# Patient Record
(demographics unavailable — no encounter records)

---

## 2024-10-15 NOTE — LETTER BODY
[FreeTextEntry1] : Kevin Mistry  [address] [place] [address] P [phone] F [phone]   Dear Dr. Mistry,   Reason for visit: BPH. Left inguinal hernia.    This is a 82 year-old gentleman with hypertension, hyperlipidemia, and history of gout, presenting with chronic BPH and a left inguinal hernia. He returns today for follow up. Since his last visit, the patient reports taking Flomax QD regularly without any side effects or difficulties. Patient reports stable urinary flow. He has not yet undergone hernia repair. Patient denies any urinary retention or hematuria or changes in health. Patient has no pain. The past medical history and family history and social history are unchanged. All other review of systems are negative. Patient denies any changes in medications. Medication list was reconciled.    On examination, the patient is a healthy-appearing gentleman in no acute distress. He is alert and oriented follows commands. He has normal mood and affect. He is normocephalic. Oral no thrush. Neck is supple. Respirations are unlabored. His abdomen is soft and nontender. Liver is nonpalpable. Bladder is nonpalpable. No CVA tenderness. Neurologically he is grossly intact. No peripheral edema. Skin without gross abnormality.     His BMP on October 2024 demonstrated normal renal function, creatinine 1.23. His PSA this year was 3.44, which is improved and stable. His PSA was 4.04 last year.   Assessment: BPH, symptoms stable with Flomax. Left inguinal hernia.    I counseled the patient. He is doing well. In terms of his BPH, his symptoms are stable on medical therapy. I recommended the patient continue taking Flomax QD. I renewed his prescription for Flomax today. I encouraged him to continue medication. In terms of his elevated PSA, I reassured patient as his most recent PSA was 3.44 which is improved and within normal limits compared to his previous PSA, 4.04. Risks and alternatives were discussed. I answered the patient's questions. The patient will follow up as directed and will contact me with any questions or concerns. Thank you for the opportunity to participate in the care of Mr. CLARKE. I will keep you updated on his progress.    Plan: Continue Flomax. Follow up in 1 year.

## 2024-10-15 NOTE — HISTORY OF PRESENT ILLNESS
[FreeTextEntry1] : Follow-up for BPH, on Flomax once daily, reports stable symptom Follow-up for left hernia, patient is asymptomatic Follow-up for elevated PSA, PSA 4.04 in 10/2023, patient wants to monitor  Please refer to [facility] Consult Note.

## 2024-10-15 NOTE — ADDENDUM
[FreeTextEntry1] : Entered by Santiago Franklin, acting as scribe for Dr. Geoff Rader. The documentation recorded by the scribe accurately reflects the service I personally performed and the decisions made by me.